# Patient Record
Sex: MALE | URBAN - METROPOLITAN AREA
[De-identification: names, ages, dates, MRNs, and addresses within clinical notes are randomized per-mention and may not be internally consistent; named-entity substitution may affect disease eponyms.]

---

## 2020-08-24 ENCOUNTER — OFFICE VISIT (OUTPATIENT)
Dept: URBAN - METROPOLITAN AREA CLINIC 33 | Facility: CLINIC | Age: 55
End: 2020-08-24

## 2020-08-24 NOTE — HPI-MIGRATED HPI
;     Decreased appetite : Patient presents today with decreaed appetite. Patient admits dysphagia, globus, sour eructations, bloating/gas, indigestion, early satiety, changes in appetite, coughing, abdominal/epigastric pain, or changes in bowel habits.  Patient states that he has not had any recent changes in their medications.  Patient states that he wishes to consult with Dr. Ryan regarding percutaneous endoscopic gastrostomy (PEG).   Patient's recent labs showed that: WBC H 18.4 RBC L 3.37 Hgb L 10.7 Htc L 32. 3 Neutrophils 15.8;